# Patient Record
Sex: MALE | Race: WHITE | NOT HISPANIC OR LATINO | Employment: OTHER | URBAN - METROPOLITAN AREA
[De-identification: names, ages, dates, MRNs, and addresses within clinical notes are randomized per-mention and may not be internally consistent; named-entity substitution may affect disease eponyms.]

---

## 2017-07-26 ENCOUNTER — OFFICE VISIT (OUTPATIENT)
Dept: NEUROLOGY | Facility: CLINIC | Age: 67
End: 2017-07-26
Payer: COMMERCIAL

## 2017-07-26 VITALS
SYSTOLIC BLOOD PRESSURE: 143 MMHG | HEART RATE: 64 BPM | BODY MASS INDEX: 28.52 KG/M2 | DIASTOLIC BLOOD PRESSURE: 76 MMHG | HEIGHT: 67 IN | WEIGHT: 181.69 LBS

## 2017-07-26 DIAGNOSIS — I60.9 SUBARACHNOID HEMORRHAGE: ICD-10-CM

## 2017-07-26 DIAGNOSIS — R55 SYNCOPE AND COLLAPSE: Primary | ICD-10-CM

## 2017-07-26 DIAGNOSIS — R56.9 SEIZURE: ICD-10-CM

## 2017-07-26 PROCEDURE — 1125F AMNT PAIN NOTED PAIN PRSNT: CPT | Mod: S$GLB,,, | Performed by: NEUROMUSCULOSKELETAL MEDICINE & OMM

## 2017-07-26 PROCEDURE — 99999 PR PBB SHADOW E&M-EST. PATIENT-LVL III: CPT | Mod: PBBFAC,,, | Performed by: NEUROMUSCULOSKELETAL MEDICINE & OMM

## 2017-07-26 PROCEDURE — 1159F MED LIST DOCD IN RCRD: CPT | Mod: S$GLB,,, | Performed by: NEUROMUSCULOSKELETAL MEDICINE & OMM

## 2017-07-26 PROCEDURE — 99213 OFFICE O/P EST LOW 20 MIN: CPT | Mod: S$GLB,,, | Performed by: NEUROMUSCULOSKELETAL MEDICINE & OMM

## 2017-07-26 NOTE — PROGRESS NOTES
Progress Notes        Present history: Patient presents for follow-up with history of seizures subarachnoid hemorrhage.  He is doing well with no more passout spells.  He is adopted Poweraid 0 sugar for hydration.  He exercises regularly and has no complaints today.  He is planning to retire in one month.      Previous note: 12-2-16: Patient presents for follow-up after [facility] hospitalization.  Patient has brought records from admit date 8-18-16 where he was seen by Dr. Chowdhury.  Patient had a seizure episode and was brought to the emergency room and was found to have a traumatic subarachnoid hemorrhage.  He was seen by neurosurgery and treated conservatively.  He had no further episodes.  He is presently taking gabapentin.  See detailed notes in [facility] records in epic.  Detailed records from [facility] were reviewed.  Previous note: 8-30-16.  This is a 66-year-old [place] male who presents with an episode of blacking out on August 18. He had  just left the pharmacy and was in the parking lot after picking up a prescription for antibiotics for recent dental work. This occurred around 5:00 in the evening. Patient had not had anything to eat all day long and head and suffering with diarrhea and vomiting probably a virus. He also notes that he had been drinking a lot of caffeine and had 3 large cups of coffee that day. At that time he had been also taking Toprol 150 mg per day and Cardizem 240 mg per day. Patient was taken to the emergency room at [facility] where an MRI, EEG and stress test all found to be normal. He reports seeing a neurologist at [facility] where he was told there was a small amount of blood on the CT scan. This apparently cleared up on the MRI scan subsequently. Patient was discharged with a negative workup. He was told that his blackout spell was probably related to dehydration and combination of beta blockers. He does have a history of cardiac  arrhythmia as well. He has had no further spells since that single episode. Patient has been seen by me several years ago for neck problems.     Neurological Exam:  Mental status-alert and oriented to person, place, and time; attention span and concentration is good. Fund of knowledge-patient is aware of current events and able to give detailed history of the current problem.recent and remote memory seems intact. Language function is normal with no evidence of aphasia  Cranial nerves:Visual acuity to hand chart -normal; visual fields to confrontation normal;pupils were equal and reactive to light ;no evidence of ptosis ; funduscopic examination was normal with sharp disc margins. external ocular movements were full with no nystagmus. Facial sensation to pinprick : normal ; corneal reflexes intact; Facial muscles were symmetrical. Hearing is unimpaired symmetrical finger rub; Tongue movements - normal ; palate movements - normal ;Swallowing unimpaired. Shoulder shrug was intact with good strength Speech was normal  Motor examination: Upper : normal   Lower extremities - Normal;muscle tone was normal ; Right-handed  Sensory examination: Upper; normal pinprick and soft touch ;   Lower extremities - normal and symmetrical.   Vibration sense: 15-20 seconds @ toes  Deep tendon reflexes: upper extremities :1-2+ symmetrical ;  lower extremities KJ- 1-2 +; AJ - 1-2+ Both plantar responses were flexor  Cerebellar examination upper: Normal finger to nose and rapid alternating movements  Gait: Steady with no ataxia; heel and toe walk normal  Romberg test: negative Tandem gait: Normal    Involuntary movements: Negative  TMJ - no tenderness  Cervical examination: Full range of motion with no pain Cervical tenderness :negative  Lumbar examination: Low back tenderness-negative   Sciatic notchtenderness-negative Straight leg raising : negative     Impression: Seizure episode with history subarachnoid hemorrhage; Syncope probably  secondary to dehydration and beta blockers.     Recommendations/Plan : Continue present medications. Follow-up in 6 months

## 2017-10-27 ENCOUNTER — OFFICE VISIT (OUTPATIENT)
Dept: NEUROLOGY | Facility: CLINIC | Age: 67
End: 2017-10-27
Payer: COMMERCIAL

## 2017-10-27 VITALS
HEIGHT: 67 IN | SYSTOLIC BLOOD PRESSURE: 189 MMHG | HEART RATE: 60 BPM | WEIGHT: 172.38 LBS | DIASTOLIC BLOOD PRESSURE: 95 MMHG | BODY MASS INDEX: 27.06 KG/M2

## 2017-10-27 DIAGNOSIS — R55 SYNCOPE AND COLLAPSE: Primary | ICD-10-CM

## 2017-10-27 DIAGNOSIS — R56.9 SEIZURE: ICD-10-CM

## 2017-10-27 DIAGNOSIS — I60.9 SUBARACHNOID HEMORRHAGE: ICD-10-CM

## 2017-10-27 PROCEDURE — 99213 OFFICE O/P EST LOW 20 MIN: CPT | Mod: S$GLB,,, | Performed by: NEUROMUSCULOSKELETAL MEDICINE & OMM

## 2017-10-27 PROCEDURE — 99999 PR PBB SHADOW E&M-EST. PATIENT-LVL II: CPT | Mod: PBBFAC,,, | Performed by: NEUROMUSCULOSKELETAL MEDICINE & OMM

## 2018-04-25 ENCOUNTER — OFFICE VISIT (OUTPATIENT)
Dept: NEUROLOGY | Facility: CLINIC | Age: 68
End: 2018-04-25
Payer: COMMERCIAL

## 2018-04-25 VITALS
WEIGHT: 154.31 LBS | HEART RATE: 54 BPM | BODY MASS INDEX: 24.22 KG/M2 | SYSTOLIC BLOOD PRESSURE: 159 MMHG | DIASTOLIC BLOOD PRESSURE: 83 MMHG | HEIGHT: 67 IN

## 2018-04-25 DIAGNOSIS — G54.2 CERVICAL SYNDROME: ICD-10-CM

## 2018-04-25 DIAGNOSIS — M50.90 CERVICAL DISC DISEASE: ICD-10-CM

## 2018-04-25 DIAGNOSIS — M79.601 PAIN OF RIGHT UPPER EXTREMITY: Primary | ICD-10-CM

## 2018-04-25 PROCEDURE — 99214 OFFICE O/P EST MOD 30 MIN: CPT | Mod: S$GLB,,, | Performed by: NEUROMUSCULOSKELETAL MEDICINE & OMM

## 2018-04-25 PROCEDURE — 99999 PR PBB SHADOW E&M-EST. PATIENT-LVL III: CPT | Mod: PBBFAC,,, | Performed by: NEUROMUSCULOSKELETAL MEDICINE & OMM

## 2018-04-25 RX ORDER — HYDROCODONE BITARTRATE AND ACETAMINOPHEN 7.5; 325 MG/1; MG/1
1 TABLET ORAL EVERY 12 HOURS PRN
Refills: 0 | COMMUNITY
Start: 2018-04-16

## 2018-04-25 RX ORDER — AMPICILLIN 500 MG/1
500 CAPSULE ORAL 2 TIMES DAILY
Refills: 1 | COMMUNITY
Start: 2018-04-02

## 2018-04-25 RX ORDER — EMOLLIENT COMBINATION NO.32
EMULSION, EXTENDED RELEASE TOPICAL
Refills: 3 | COMMUNITY
Start: 2018-04-21

## 2018-04-25 RX ORDER — GABAPENTIN 100 MG/1
100 CAPSULE ORAL 3 TIMES DAILY
Refills: 1 | COMMUNITY
Start: 2018-03-20

## 2018-04-25 RX ORDER — DILTIAZEM HYDROCHLORIDE 240 MG/1
240 CAPSULE, EXTENDED RELEASE ORAL DAILY
Refills: 2 | COMMUNITY
Start: 2018-03-26

## 2018-04-25 RX ORDER — ALBUTEROL SULFATE 90 UG/1
AEROSOL, METERED RESPIRATORY (INHALATION)
Refills: 0 | COMMUNITY
Start: 2018-03-15

## 2018-04-25 RX ORDER — BENZONATATE 200 MG/1
CAPSULE ORAL
Refills: 0 | COMMUNITY
Start: 2018-04-02

## 2018-04-25 NOTE — PROGRESS NOTES
Present history: Patient has had no further seizures.  He presents with predominant problems in the neck with an MRI of the cervical spine outside showing cervical spondylosis with bilateral C6-7 foraminal stenosis as well as C4-5 and C5-6.  He complains of numbness and tingling in the right arm and neck pain.  He is presently attending physical therapy for right arm pain and shoulder pain described by the orthopedist outside [facility].  Patient had issues with diabetes however insist that he has diet-controlled and does not have diabetes now.  He had hemoglobin A1c of 10.7 and a glucose of 315 after [place] from [place].  His orthopedist wants him to have an EMG which we will schedule.  He had been monitoring his blood pressure after which he was cardiologist increased his blood pressure medication.  Previous note: 10-27-17: Patient presents for follow-up for his seizure episodes.  He complains of some mild headaches but has had recent sinus infections fluid.  He is concerned about his blood pressure was high today.  He does note that he has been drinking a lot of Powerade with sodium.  He has had no further seizures spells.  We recommended that he obtain a blood pressure cuff and monitor his blood pressure twice a day for a week and follow-up with primary care  Previous note: 7-26-17: Patient presents for follow-up with history of seizures subarachnoid hemorrhage.  He is doing well with no more passout spells.  He is adopted Poweraid 0 sugar for hydration.  He exercises regularly and has no complaints today.  He is planning to retire in one month.        Previous note: 12-2-16: Patient presents for follow-up after [facility] hospitalization.  Patient has brought records from admit date 8-18-16 where he was seen by Dr. Chowdhury.  Patient had a seizure episode and was brought to the emergency room and was found to have a traumatic subarachnoid hemorrhage.  He was seen by neurosurgery and  treated conservatively.  He had no further episodes.  He is presently taking gabapentin.  See detailed notes in [facility] records in epic.  Detailed records from [facility] were reviewed.  Previous note: 8-30-16.  This is a 66-year-old [place] male who presents with an episode of blacking out on August 18. He had  just left the pharmacy and was in the parking lot after picking up a prescription for antibiotics for recent dental work. This occurred around 5:00 in the evening. Patient had not had anything to eat all day long and head and suffering with diarrhea and vomiting probably a virus. He also notes that he had been drinking a lot of caffeine and had 3 large cups of coffee that day. At that time he had been also taking Toprol 150 mg per day and Cardizem 240 mg per day. Patient was taken to the emergency room at [facility] where an MRI, EEG and stress test all found to be normal. He reports seeing a neurologist at [facility] where he was told there was a small amount of blood on the CT scan. This apparently cleared up on the MRI scan subsequently. Patient was discharged with a negative workup. He was told that his blackout spell was probably related to dehydration and combination of beta blockers. He does have a history of cardiac arrhythmia as well. He has had no further spells since that single episode. Patient has been seen by me several years ago for neck problems.     Neurological Exam:  Mental status-alert and oriented to person, place, and time; attention span and concentration is good. Fund of knowledge-patient is aware of current events and able to give detailed history of the current problem.recent and remote memory seems intact. Language function is normal with no evidence of aphasia  Cranial nerves:Visual acuity to hand chart -normal; visual fields to confrontation normal;pupils were equal and reactive to light ;no evidence of ptosis ; funduscopic examination was  normal with sharp disc margins. external ocular movements were full with no nystagmus. Facial sensation to pinprick : normal ; corneal reflexes intact; Facial muscles were symmetrical. Hearing is unimpaired symmetrical finger rub; Tongue movements - normal ; palate movements - normal ;Swallowing unimpaired. Shoulder shrug was intact with good strength Speech was normal  Motor examination: Upper : normal   Lower extremities - Normal;muscle tone was normal ; Right-handed  Sensory examination: Upper; normal pinprick and soft touch ;   Lower extremities - normal and symmetrical.   Vibration sense: 15-20 seconds @ toes  Deep tendon reflexes: upper extremities :1-2+ symmetrical ;  lower extremities KJ- 1-2 +; AJ - 1-2+ Both plantar responses were flexor  Cerebellar examination upper: Normal finger to nose and rapid alternating movements  Gait: Steady with no ataxia; heel and toe walk normal  Romberg test: negative Tandem gait: Normal    Involuntary movements: Negative  TMJ - no tenderness  Cervical examination: Full range of motion with no pain Cervical tenderness :negative  Lumbar examination: Low back tenderness-negative   Sciatic notchtenderness-negative Straight leg raising : negative     Impression: cervical disc disease with spondylosis (see scanned  MRI report);Seizure episode with history subarachnoid hemorrhage; Syncope probably secondary to dehydration and beta blockers.     Recommendations/Plan : Continue present medications.EMG nerve conduction studies right upper extremity ;Follow -up in 6 months

## 2018-06-18 ENCOUNTER — PROCEDURE VISIT (OUTPATIENT)
Dept: NEUROLOGY | Facility: CLINIC | Age: 68
End: 2018-06-18
Payer: MEDICARE

## 2018-06-18 DIAGNOSIS — M79.601 PAIN OF RIGHT UPPER EXTREMITY: ICD-10-CM

## 2018-06-18 PROCEDURE — 95886 MUSC TEST DONE W/N TEST COMP: CPT | Mod: 26,S$PBB,, | Performed by: NEUROMUSCULOSKELETAL MEDICINE & OMM

## 2018-06-18 PROCEDURE — 95911 NRV CNDJ TEST 9-10 STUDIES: CPT | Mod: 26,S$PBB,, | Performed by: NEUROMUSCULOSKELETAL MEDICINE & OMM

## 2018-06-18 PROCEDURE — 95886 MUSC TEST DONE W/N TEST COMP: CPT | Mod: PBBFAC,PO | Performed by: NEUROMUSCULOSKELETAL MEDICINE & OMM

## 2018-06-18 PROCEDURE — 95911 NRV CNDJ TEST 9-10 STUDIES: CPT | Mod: PBBFAC,PO | Performed by: NEUROMUSCULOSKELETAL MEDICINE & OMM

## 2018-07-13 ENCOUNTER — TELEPHONE (OUTPATIENT)
Dept: NEUROLOGY | Facility: CLINIC | Age: 68
End: 2018-07-13

## 2018-07-13 NOTE — TELEPHONE ENCOUNTER
----- Message from Adelfo RAZO Taisha sent at 7/13/2018 12:52 PM CDT -----  Needs Advice    Reason for call: Pt is asking to speak w/ Dr. Harvey about pt's orthopedic doctor, Dr. Hoang Adams with [facility], not receiving pt's EMG results that Dr. Harvey said he would send to the doctor.  Communication Preference: [phone]  Additional Information: Pt states he's currently at Dr. Adams's office now

## 2021-09-22 ENCOUNTER — TELEPHONE (OUTPATIENT)
Dept: NEUROLOGY | Facility: CLINIC | Age: 71
End: 2021-09-22